# Patient Record
Sex: FEMALE | Race: OTHER | URBAN - METROPOLITAN AREA
[De-identification: names, ages, dates, MRNs, and addresses within clinical notes are randomized per-mention and may not be internally consistent; named-entity substitution may affect disease eponyms.]

---

## 2018-02-22 NOTE — PATIENT DISCUSSION
(H40.013) Open angle with borderline findings, low risk, bilateral - Assesment : Examination revealed suspicion for Open Angle Glaucoma OD>OS. IOP elevated today OD>OS. Denies FHx of glaucoma. Not dilated today due to elevated IOP. - Plan : Recommend Zioptan QHS OD. Monitor for changes. Advised patient to call our office when decreased vision or increased eye pain. RV 1 weeks dilate/ONH OCT/refraction - New baseline.

## 2018-03-01 NOTE — PATIENT DISCUSSION
(H25.013) Cortical age-related cataract, bilateral - Assesment : Examination revealed Cortical  Cataract OU - Plan : Monitor for changes. Advised patient to call our office with decreased vision or increased symptoms.

## 2018-03-01 NOTE — PATIENT DISCUSSION
(H40.013) Open angle with borderline findings, low risk, bilateral - Assesment : Examination revealed suspicion for Open Angle Glaucoma OD>OS. IOP decreased on Zioptan OD. Denies FHx of glaucoma. - Plan : Continue  Zioptan QHS OD. Monitor for changes. Advised patient to call our office when decreased vision or increased eye pain. Schedule an appointment for next available consult with Dr Darrius Guo for ptosis evaluation.

## 2018-03-01 NOTE — PATIENT DISCUSSION
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 - Plan : Schedule an appointment a  next available consult with Dr Charissa Osorio for ptosis evaluation.

## 2018-03-06 NOTE — PATIENT DISCUSSION
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

## 2018-03-06 NOTE — PATIENT DISCUSSION
(H25.013) Cortical age-related cataract, bilateral - Assesment : Examination revealed Cortical  Cataract OU-CORTICAL [address]. ADVISED PATIENT THE CENTRAL VISION IS DECREASED DUE TO CATARACTS, SIGNIFICANT DERMATOCHALASIS OU. PATIENT MAY CONSIDER BULB TO OPEN UP THE FIELD OF VISION AND INCREASE THE PERIPHERAL VISION. ADVISED PATIENT SOMETIMES THE LID POSITION CAN CHANGE AFTER CATARACT SURGERY. RECOMMEND PROCEEDING WITH CATARACT SURGERY FIRST SINCE PATIENT IS BOTHERED BY BLURRY VISION.  - Plan : A SCAN WITH DR. Alia Oliva

## 2018-03-06 NOTE — PATIENT DISCUSSION
(Q20.479) Myogenic ptosis of right eyelid - Assesment : Examination revealed Myogenic Ptosis - Plan : OBSERVATION

## 2018-03-06 NOTE — PATIENT DISCUSSION
(C04.401) Dermatochalasis of left upper eyelid - Assesment : Examination revealed Dermatochalasis - Plan : SEE PLAN # 1

## 2018-03-06 NOTE — PATIENT DISCUSSION
(B58.648) Dermatochalasis of right upper eyelid - Assesment : Examination revealed Dermatochalasis - Plan : Monitor for changes. Advised patient to call our office with decreased vision or increased symptoms. CONSIDER LID EVAL AFTER CATARACT SURGERY.

## 2018-03-06 NOTE — PATIENT DISCUSSION
(Y90.201) Vitreous degeneration, bilateral - Assesment : Examination revealed PVD - Plan : Monitor for changes. Advised patient to call our office with decreased vision or an increase in flashes and/or floaters.

## 2018-05-10 NOTE — PATIENT DISCUSSION
(H25.013) Cortical age-related cataract, bilateral - Assesment : Examination reveals moderate cataract, patient is symptomatic with visual function affected. - Plan : Risks, Benefits and Alternatives were discussed with patient at length for Cataract Surgery. Visual symptoms are consistent with Cataract findings on examination and current refraction no longer provides satisfactory vision. Explained advantages of Toric IOL to correct significant corneal astigmatism. LRI/AK's are also explained. Intraoperative abberometry is included to maximize lens power and position. LRI/AK's and laser vision correction are explained as alternatives or adjunct procedures. Corrective measures for astigmatism management for lens repositioning and possible additional refractive surgery are included. Glasses or contacts will be needed for best vision at distance and near if not selected. Patient understands and desires surgery. All questions answered. Risks, Benefits and Alternatives discussed at length for IOL placement. **** Patient is scheduled to have lid surgery on May 16th. We will schedule cataract surgery two months past his lid surgery. ****  EYE: OS IOL TYPE: Toric- distance POST OPERATIVE TARGET: [facility] RECOMMENDED PACKAGE:  Toric pkg w/[facility] PT PREFERRED PACKAGE: No Package Basic (bl) OD to follow  Patient to see surgery counselor today.

## 2018-07-26 NOTE — PATIENT DISCUSSION
(Z96.1) Presence of intraocular lens - Assesment : Patient is Pseudophakic. - Elevated IOP (36) - 2+ corneal edema - Plan : anterior chamber paracentesis performed today  Discussed signs and symptoms of infection and retinal detachments. Do not rub operated eye. Follow drop schedule If redness,pain,decreased vision, flashes or floaters occur then contact clinic.  Start Zioptan QHS OS x1wk (sample provided today)  Return 1wk CE

## 2018-07-31 NOTE — PATIENT DISCUSSION
(U74.379) Vitreous degeneration, left eye - Assesment : Examination revealed a posterior vitreous detachment.  - Plan : Monitor for changes in size and shape

## 2018-07-31 NOTE — PATIENT DISCUSSION
(Z96.1) Presence of intraocular lens - Assesment : Patient is Pseudophakic. IOP OU is high - Plan : Discussed signs and symptoms of infection and retinal detachments. Do not rub operated eye. Follow drop schedule If redness,pain,decreased vision, flashes or floaters occur then contact clinic.  Continue Zioptan  QHS OS, Start QHS OD  RTC 3 weeks follow up

## 2018-07-31 NOTE — PATIENT DISCUSSION
(H25.011) Cortical age-related cataract, right eye - Assesment : Examination reveals moderate cataract, patient is symptomatic with visual function affected. - Plan : Risks, Benefits and Alternatives were discussed with patient at length for Cataract Surgery. Visual symptoms are consistent with Cataract findings on examination and current refraction no longer provides satisfactory vision. Explained advantages of Toric IOL to correct significant corneal astigmatism. LRI/AK's are also explained. Intraoperative abberometry is included to maximize lens power and position. LRI/AK's and laser vision correction are explained as alternatives or adjunct procedures. Corrective measures for astigmatism management for lens repositioning and possible additional refractive surgery are included. Glasses or contacts will be needed for best vision at distance and near if not selected. Patient understands and desires surgery. All questions answered. Risks, Benefits and Alternatives discussed at length for IOL placement. **** Patient is scheduled to have lid surgery on May 16th. We will schedule cataract surgery two months past his lid surgery. ****  EYE: OD IOL TYPE: Monofocal - distance POST OPERATIVE TARGET: [place]  RECOMMENDED PACKAGE:  None PT PREFERRED PACKAGE: None  Patient to see surgery counselor today.

## 2018-08-09 NOTE — PATIENT DISCUSSION
(Z96.1) Presence of intraocular lens - Assesment : Patient is Pseudophakic. Elevated IOP 32 mmhz - Plan : Discussed signs and symptoms of infection and retinal detachments. Do not rub operated eye. Follow drop schedule If redness,pain,decreased vision, flashes or floaters occur then contact clinic. Continue Zioptan QHS OD. Stop OS   RTC 1 week follow up

## 2018-08-14 NOTE — PATIENT DISCUSSION
(Z96.1) Presence of intraocular lens - Assesment : Patient is Pseudophakic. - Plan : Discussed signs and symptoms of infection and retinal detachments. Do not rub operated eye. Follow drop schedule If redness,pain,decreased vision, flashes or floaters occur then contact clinic.  Continue Zioptan QHS OD f until seen at follow up  RTC 3 weeks follow up

## 2018-09-05 NOTE — PATIENT DISCUSSION
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 - Plan : see plan #1.

## 2018-09-05 NOTE — PATIENT DISCUSSION
(Z96.1) Presence of intraocular lens - Assesment : Patient is Pseudophakic OU. IOP slightly elevated OD, possible steroid responder. - Plan : Continue Zioptan QHS OD, if IOP improves may consider d/c drops next visit. Patient has finished all post op surgery drops. Signs and symptoms of infection and retinal detachment are outlined in your surgical packet. Do not rub operated eye. If redness, pain, decreased vision, flashes or floaters occur then contact clinic. RV 1 month tn check.

## 2018-10-03 NOTE — PATIENT DISCUSSION
(Z96.1) Presence of intraocular lens - Assesment : Patient is Pseudophakic OU. IOP stable today OD, possible steroid responder. - Plan : Recommend d/c Zioptan QHS OD. Signs and symptoms of infection and retinal detachment are outlined in your surgical packet. Do not rub operated eye. If redness, pain, decreased vision, flashes or floaters occur then contact clinic. RV 3 months tn check.

## 2021-04-13 NOTE — PATIENT DISCUSSION
Sample of Heraclio Nelson provided to patient to try. If patient likes the medication he can call and have Rx sent.

## 2021-05-03 NOTE — PATIENT DISCUSSION
Sample of Carlos Appl provided to patient to try. If patient likes the medication he can call and have Rx sent.

## 2021-05-18 NOTE — PATIENT DISCUSSION
Sample of [address] provided to patient to try. If patient likes the medication he can call and have Rx sent.

## 2021-08-24 ENCOUNTER — IMPORTED ENCOUNTER (OUTPATIENT)
Dept: URBAN - METROPOLITAN AREA CLINIC 9 | Facility: CLINIC | Age: 66
End: 2021-08-24

## 2021-08-24 PROBLEM — H25.11: Noted: 2021-08-24

## 2021-08-24 PROBLEM — H25.13: Noted: 2021-08-24

## 2021-10-16 ASSESSMENT — KERATOMETRY
OD_AXISANGLE2_DEGREES: 90
OS_K1POWER_DIOPTERS: 44.5
OS_AXISANGLE_DEGREES: 14
OD_K2POWER_DIOPTERS: 44
OD_K1POWER_DIOPTERS: 44
OS_K2POWER_DIOPTERS: 45
OS_AXISANGLE2_DEGREES: 104
OD_AXISANGLE_DEGREES: 180

## 2021-10-16 ASSESSMENT — VISUAL ACUITY
OD_CC: 20/30 SN
OS_CC: 20/50 SN
OD_CC: 20/50 SN
OS_CC: 20/40 SN

## 2021-10-16 ASSESSMENT — TONOMETRY
OD_IOP_MMHG: 16
OS_IOP_MMHG: 14

## 2021-10-26 ENCOUNTER — ESTABLISHED PATIENT (OUTPATIENT)
Dept: URBAN - METROPOLITAN AREA CLINIC 9 | Facility: CLINIC | Age: 66
End: 2021-10-26

## 2021-10-26 ENCOUNTER — PREPPED CHART (OUTPATIENT)
Dept: URBAN - METROPOLITAN AREA CLINIC 9 | Facility: CLINIC | Age: 66
End: 2021-10-26

## 2021-10-26 VITALS — DIASTOLIC BLOOD PRESSURE: 64 MMHG | HEART RATE: 71 BPM | SYSTOLIC BLOOD PRESSURE: 126 MMHG | HEIGHT: 55 IN

## 2021-10-26 DIAGNOSIS — H25.13: ICD-10-CM

## 2021-10-26 PROCEDURE — 92136 OPHTHALMIC BIOMETRY: CPT

## 2021-10-26 PROCEDURE — 99213 OFFICE O/P EST LOW 20 MIN: CPT

## 2021-10-26 ASSESSMENT — KERATOMETRY
OD_AXISANGLE_DEGREES: 180
OD_K2POWER_DIOPTERS: 44
OD_K2POWER_DIOPTERS: 44
OS_AXISANGLE2_DEGREES: 104
OD_AXISANGLE_DEGREES: 180
OD_K1POWER_DIOPTERS: 44
OS_AXISANGLE_DEGREES: 14
OD_K1POWER_DIOPTERS: 44
OS_AXISANGLE_DEGREES: 14
OS_K2POWER_DIOPTERS: 45
OD_AXISANGLE2_DEGREES: 90
OS_K1POWER_DIOPTERS: 44.5
OS_K1POWER_DIOPTERS: 44.5
OD_AXISANGLE2_DEGREES: 90
OS_K2POWER_DIOPTERS: 45
OS_AXISANGLE2_DEGREES: 104

## 2021-10-26 ASSESSMENT — VISUAL ACUITY
OD_CC: 20/30
OS_CC: 20/40

## 2021-11-03 ENCOUNTER — CATARACT POST-OP 1-DAY (OUTPATIENT)
Dept: URBAN - METROPOLITAN AREA CLINIC 14 | Facility: CLINIC | Age: 66
End: 2021-11-03

## 2021-11-03 DIAGNOSIS — Z96.1: ICD-10-CM

## 2021-11-03 DIAGNOSIS — H25.11: ICD-10-CM

## 2021-11-03 PROCEDURE — 99024 POSTOP FOLLOW-UP VISIT: CPT

## 2021-11-03 PROCEDURE — 92136 OPHTHALMIC BIOMETRY: CPT

## 2021-11-03 ASSESSMENT — KERATOMETRY
OS_AXISANGLE2_DEGREES: 104
OD_AXISANGLE_DEGREES: 180
OD_AXISANGLE2_DEGREES: 90
OS_K1POWER_DIOPTERS: 44.5
OS_AXISANGLE_DEGREES: 14
OD_K2POWER_DIOPTERS: 44
OS_K2POWER_DIOPTERS: 45
OD_K1POWER_DIOPTERS: 44

## 2021-11-03 ASSESSMENT — VISUAL ACUITY
OD_BCVA: 20/30
OS_SC: 20/25

## 2021-11-03 ASSESSMENT — TONOMETRY: OS_IOP_MMHG: 13

## 2021-11-10 ENCOUNTER — 1 DAY POST-OP (OUTPATIENT)
Dept: URBAN - METROPOLITAN AREA CLINIC 14 | Facility: CLINIC | Age: 66
End: 2021-11-10

## 2021-11-10 DIAGNOSIS — Z96.1: ICD-10-CM

## 2021-11-10 PROCEDURE — 99024 POSTOP FOLLOW-UP VISIT: CPT

## 2021-11-10 ASSESSMENT — KERATOMETRY
OS_K1POWER_DIOPTERS: 44.5
OS_AXISANGLE_DEGREES: 14
OS_AXISANGLE2_DEGREES: 104
OD_K1POWER_DIOPTERS: 44
OS_K2POWER_DIOPTERS: 45
OD_AXISANGLE2_DEGREES: 90
OD_K2POWER_DIOPTERS: 44
OD_AXISANGLE_DEGREES: 180

## 2021-11-10 ASSESSMENT — VISUAL ACUITY
OD_SC: 20/30
OS_SC: 20/25

## 2021-11-10 ASSESSMENT — TONOMETRY
OD_IOP_MMHG: 22
OS_IOP_MMHG: 20

## 2021-12-16 ENCOUNTER — POST-OP (OUTPATIENT)
Dept: URBAN - METROPOLITAN AREA CLINIC 14 | Facility: CLINIC | Age: 66
End: 2021-12-16

## 2021-12-16 DIAGNOSIS — Z96.1: ICD-10-CM

## 2021-12-16 PROCEDURE — 99024LK POST OP LASIK CARE ONLY

## 2021-12-16 ASSESSMENT — KERATOMETRY
OD_AXISANGLE_DEGREES: 23
OS_K2POWER_DIOPTERS: 45.00
OS_AXISANGLE_DEGREES: 171
OD_K2POWER_DIOPTERS: 44.25
OD_AXISANGLE2_DEGREES: 113
OD_K1POWER_DIOPTERS: 44.00
OS_K1POWER_DIOPTERS: 44.25
OS_AXISANGLE2_DEGREES: 81

## 2021-12-16 ASSESSMENT — TONOMETRY
OS_IOP_MMHG: 15
OD_IOP_MMHG: 17

## 2021-12-16 ASSESSMENT — VISUAL ACUITY
OS_SC: 20/20
OD_SC: 20/30

## 2022-06-29 RX ORDER — IBUPROFEN 200 MG
TABLET ORAL
COMMUNITY

## 2022-06-29 RX ORDER — DIAZEPAM 5 MG/1
TABLET ORAL
COMMUNITY
Start: 2022-02-28 | End: 2022-09-19 | Stop reason: SDUPTHER

## 2022-06-29 RX ORDER — DILTIAZEM HYDROCHLORIDE 60 MG/1
TABLET, FILM COATED ORAL
COMMUNITY

## 2022-06-29 RX ORDER — SPIRONOLACTONE 25 MG/1
12.5 TABLET ORAL DAILY
COMMUNITY

## 2022-06-29 RX ORDER — ATORVASTATIN CALCIUM 20 MG/1
TABLET, FILM COATED ORAL
COMMUNITY
Start: 2021-11-23

## 2022-06-29 RX ORDER — FOLIC ACID 1 MG/1
TABLET ORAL
COMMUNITY
End: 2022-08-28 | Stop reason: SDUPTHER

## 2022-06-29 RX ORDER — PREDNISONE 20 MG/1
TABLET ORAL
COMMUNITY

## 2022-06-29 RX ORDER — RISPERIDONE 0.5 MG/1
TABLET, FILM COATED ORAL
COMMUNITY

## 2022-06-29 RX ORDER — LANOLIN ALCOHOL/MO/W.PET/CERES
CREAM (GRAM) TOPICAL
COMMUNITY

## 2022-06-29 RX ORDER — FUROSEMIDE 80 MG
TABLET ORAL
COMMUNITY

## 2022-06-29 RX ORDER — DESLORATADINE 5 MG/1
TABLET ORAL
COMMUNITY

## 2022-06-29 RX ORDER — PANTOPRAZOLE SODIUM 40 MG/1
TABLET, DELAYED RELEASE ORAL
COMMUNITY

## 2022-06-29 RX ORDER — POTASSIUM CHLORIDE 750 MG/1
CAPSULE, EXTENDED RELEASE ORAL
COMMUNITY

## 2022-06-29 RX ORDER — ACYCLOVIR 400 MG/1
TABLET ORAL
COMMUNITY

## 2022-06-29 RX ORDER — LANOLIN ALCOHOL/MO/W.PET/CERES
CREAM (GRAM) TOPICAL
COMMUNITY
End: 2022-10-03

## 2022-06-29 RX ORDER — SULFAMETHOXAZOLE AND TRIMETHOPRIM 800; 160 MG/1; MG/1
TABLET ORAL
COMMUNITY

## 2022-06-29 RX ORDER — DILTIAZEM HYDROCHLORIDE 240 MG/1
1 CAPSULE, COATED, EXTENDED RELEASE ORAL
COMMUNITY

## 2022-06-29 RX ORDER — FERROUS SULFATE 325(65) MG
TABLET ORAL
COMMUNITY

## 2022-06-29 RX ORDER — CLONAZEPAM 1 MG/1
TABLET ORAL
COMMUNITY

## 2022-06-29 RX ORDER — OXYCODONE AND ACETAMINOPHEN 10; 325 MG/1; MG/1
TABLET ORAL
COMMUNITY

## 2022-06-29 RX ORDER — LIDOCAINE 50 MG/G
PATCH TOPICAL
COMMUNITY
End: 2022-09-19 | Stop reason: SDUPTHER

## 2022-06-29 RX ORDER — ISOSORBIDE DINITRATE 5 MG/1
TABLET ORAL
COMMUNITY
Start: 2021-11-23

## 2022-06-29 RX ORDER — SUCRALFATE 1 G/1
TABLET ORAL
COMMUNITY

## 2022-07-20 PROBLEM — K70.30 ALCOHOLIC CIRRHOSIS OF LIVER WITHOUT ASCITES (HCC): Status: ACTIVE | Noted: 2022-07-20

## 2022-07-20 PROBLEM — D50.9 IRON DEFICIENCY ANEMIA: Status: ACTIVE | Noted: 2022-07-20

## 2022-07-20 PROBLEM — E61.1 IRON DEFICIENCY: Status: ACTIVE | Noted: 2022-07-20

## 2022-07-20 PROBLEM — S32.030A CLOSED COMPRESSION FRACTURE OF THIRD LUMBAR VERTEBRA (HCC): Status: ACTIVE | Noted: 2022-07-20

## 2022-07-20 PROBLEM — M54.41 LUMBAGO WITH SCIATICA, RIGHT SIDE: Status: ACTIVE | Noted: 2022-07-20

## 2022-07-20 PROBLEM — R63.5 WEIGHT GAIN: Status: ACTIVE | Noted: 2022-07-20

## 2022-07-20 PROBLEM — B00.1 FEVER BLISTER: Status: ACTIVE | Noted: 2022-07-20

## 2022-07-20 PROBLEM — F41.9 ANXIETY: Status: ACTIVE | Noted: 2022-07-20

## 2022-07-20 PROBLEM — S62.646A CLOSED NONDISPLACED FRACTURE OF PROXIMAL PHALANX OF RIGHT LITTLE FINGER: Status: ACTIVE | Noted: 2022-07-20

## 2022-07-20 PROBLEM — E03.9 ACQUIRED HYPOTHYROIDISM: Status: ACTIVE | Noted: 2022-07-20

## 2022-07-20 PROBLEM — H10.31 ACUTE BACTERIAL CONJUNCTIVITIS OF RIGHT EYE: Status: ACTIVE | Noted: 2022-07-20

## 2022-07-20 PROBLEM — R06.09 DYSPNEA ON EXERTION: Status: ACTIVE | Noted: 2022-07-20

## 2022-07-20 PROBLEM — F32.1 CURRENT MODERATE EPISODE OF MAJOR DEPRESSIVE DISORDER WITHOUT PRIOR EPISODE (HCC): Status: ACTIVE | Noted: 2022-07-20

## 2022-07-20 PROBLEM — I10 HTN (HYPERTENSION), BENIGN: Status: ACTIVE | Noted: 2022-07-20

## 2022-07-20 PROBLEM — N17.9 ACUTE KIDNEY INJURY (HCC): Status: ACTIVE | Noted: 2022-07-20

## 2022-07-20 PROBLEM — G31.2 DEGENERATION OF NERVOUS SYSTEM DUE TO ALCOHOL (HCC): Status: ACTIVE | Noted: 2022-07-20

## 2022-07-20 PROBLEM — M48.061 SPINAL STENOSIS OF LUMBAR REGION AT MULTIPLE LEVELS: Status: ACTIVE | Noted: 2022-07-20

## 2022-07-20 PROBLEM — K92.2 GASTROINTESTINAL HEMORRHAGE: Status: ACTIVE | Noted: 2022-07-20

## 2022-07-20 PROBLEM — M54.42 LUMBAGO WITH SCIATICA, LEFT SIDE: Status: ACTIVE | Noted: 2022-07-20

## 2022-07-20 PROBLEM — I35.0 AORTIC VALVE STENOSIS, MILD: Status: ACTIVE | Noted: 2022-07-20

## 2022-07-20 PROBLEM — J45.40 MODERATE PERSISTENT ASTHMA WITHOUT COMPLICATION: Status: ACTIVE | Noted: 2022-07-20

## 2022-07-20 PROBLEM — R41.3 MEMORY DISTURBANCE: Status: ACTIVE | Noted: 2022-07-20

## 2024-07-31 NOTE — PATIENT DISCUSSION
Recommended observation. The echo and the zio are alright.  Reassurance mow and will get a stress to rule out any ischemia and doubt.  Will call with results and will see in 6 month